# Patient Record
Sex: FEMALE
[De-identification: names, ages, dates, MRNs, and addresses within clinical notes are randomized per-mention and may not be internally consistent; named-entity substitution may affect disease eponyms.]

---

## 2020-04-30 ENCOUNTER — HOSPITAL ENCOUNTER (OUTPATIENT)
Dept: HOSPITAL 88 - DX | Age: 35
End: 2020-04-30
Attending: UROLOGY
Payer: COMMERCIAL

## 2020-04-30 DIAGNOSIS — N13.30: Primary | ICD-10-CM

## 2020-04-30 PROCEDURE — 81025 URINE PREGNANCY TEST: CPT

## 2020-04-30 PROCEDURE — 50387 CHANGE NEPHROURETERAL CATH: CPT

## 2020-04-30 NOTE — DIAGNOSTIC IMAGING REPORT
Procedure: Right percutaneous nephrostomy catheter exchange.                   

                                  

                                                        

History: Routine maintenance.                                                

   

: Nelson Hoffman M.D. 



Assistant:  Heydi., M.D.

                                                                            

Modality: Fluoroscopy.        

               

DOSE REDUCTION: The examination was performed according to departmental

dose-optimization program. 



Fluoro time: 0.7 minutes.



Radiation dose: 4.47 mGy air Kerma.  

                                                                             

Sedation: No sedation was used. Vital signs were monitored throughout the

procedure by a dedicated RN under direct supervision of Dr. Hoffman, and

remained stable. 



Physician intra-service sedation time was Not applicable. minutes.             



Anesthesia: Lidocaine local infiltration



Medicines: Not applicable



Contrast medium: Isovue 300, 5 cc.



Estimated blood loss:  < 5 cc.         



Technique: 

A discussion of the risks, benefits, and alternatives was carried out with the

patient. A written informed consent was obtained. The patient expressed

understanding and agreed to proceed.  



A universal timeout was performed prior to starting the procedure. The

procedure room personnel used personal protective equipment. The operators used

sterile gowns and gloves.



The patient was laid prone on the procedure table. The right percutaneous

nephrostomy site was prepped with chlorhexidine gluconate and draped in sterile

fashion.



A  radiograph was performed showing the catheter to be in the expected

location. Contrast injection through the catheter confirmed the catheter tip

location in the collecting system.



After local anesthetic infiltration, the retention suture was removed. The

catheter was cut and withdrawn over a wire. It was replaced with an identical

catheter. The catheter position was confirmed with contrast injection. The

catheter was secured to skin with nonabsorbable suture.  The catheter was

connected to a gravity drainage bag. An aseptic dressing was applied.



The patient was transferred to the recovery area and discharged from the

department in stable condition.

                    

Complications: None immediate.    



Findings:

As above.

                                                          

Impression:

Successful fluoroscopic guided 8.5 Italian right nephrostomy catheter exchange

as described above.



Further management dictated by the clinical scenario. The nephrostomy catheter

should be exchanged at the latest in three months.                             

                               

   



Thank you for the opportunity to assist in the care of your patient.



Signed by: Nelson Hoffman MD on 4/30/2020 2:40 PM

## 2020-05-22 ENCOUNTER — HOSPITAL ENCOUNTER (OUTPATIENT)
Dept: HOSPITAL 88 - OR | Age: 35
Discharge: HOME | End: 2020-05-22
Attending: UROLOGY
Payer: COMMERCIAL

## 2020-05-22 VITALS — DIASTOLIC BLOOD PRESSURE: 94 MMHG | SYSTOLIC BLOOD PRESSURE: 125 MMHG

## 2020-05-22 DIAGNOSIS — Z11.59: ICD-10-CM

## 2020-05-22 DIAGNOSIS — N13.5: ICD-10-CM

## 2020-05-22 DIAGNOSIS — E03.9: ICD-10-CM

## 2020-05-22 DIAGNOSIS — R35.1: ICD-10-CM

## 2020-05-22 DIAGNOSIS — I10: ICD-10-CM

## 2020-05-22 DIAGNOSIS — Z01.812: ICD-10-CM

## 2020-05-22 DIAGNOSIS — Z93.6: Primary | ICD-10-CM

## 2020-05-22 DIAGNOSIS — N39.0: ICD-10-CM

## 2020-05-22 DIAGNOSIS — Z87.442: ICD-10-CM

## 2020-05-22 DIAGNOSIS — N13.30: ICD-10-CM

## 2020-05-22 PROCEDURE — 87635 SARS-COV-2 COVID-19 AMP PRB: CPT

## 2020-05-22 PROCEDURE — 52351 CYSTOURETERO & OR PYELOSCOPE: CPT

## 2020-05-22 PROCEDURE — 81025 URINE PREGNANCY TEST: CPT

## 2020-05-22 PROCEDURE — 74420 UROGRAPHY RTRGR +-KUB: CPT

## 2020-05-22 NOTE — OPERATIVE REPORT
DATE OF PROCEDURE:  05/22/2020

 

SURGEON:  Donine Barr MD

 

PREOPERATIVE DIAGNOSES:  

1. Right-sided nephrostomy.

2. Right hydronephrosis.

3. Microscopic hematuria.

 

POSTOPERATIVE DIAGNOSES:  

1. Right-sided nephrostomy.

2. Right hydronephrosis.

3. Ureteral stricture.

4. Microscopic hematuria.

 

PROCEDURES:  

1. Cystourethroscopy with left ureteral catheterization and left retrograde 
pyelogram

(separate procedure for microscopic hematuria). 

2. Right-sided ureteroscopy (entirely separate procedure for right ureteral 
stricture).

3. Supervision of fluoroscopy for both ureteroscopic and retrograde portions.

4. Interpretation of retrograde pyelography.

 

ANESTHESIA:  General.

 

ESTIMATED BLOOD LOSS:  Minimal.

 

COMPLICATIONS:  None.

 

INDICATIONS:  Ms. Colon is a very pleasant 34-year-old female with a history 
of right

nephrostomy placed at [facility].  She has had a history of stones and severe 
strictures.  She and

I had a long discussion about alternatives, risks, and benefits of doing 
nothing, an

attempt to internalize stents.  She voiced understanding of the options, 
alternatives,

risks, and benefits and elected to proceed. 

 

PROCEDURE IN DETAIL:  After informed consent obtained, the patient was taken to 
the

operative suite, placed supine on the operative table, underwent general 
anesthesia by

the Anesthesia Service, placed in dorsal lithotomy position and sterilely 
prepped and

draped for cystoscopy.  A 21-Tuvaluan cystoscope was inserted per urethra.  Normal
urethra

was noted.  Panendoscopy of bladder revealed no tumors, no stones.  Both 
ureteral

orifices were in normal anatomic location and position only left was seen to 
efflux

clear urine.  Bilateral retrograde pyelogram performed with a 5-Tuvaluan open-
ended

catheter.  The left was normal.  The right was blind-ending approximately 2 
inches from

the ureteral orifice. the short rigid ureteroscope was advanced to a

blind-ending pouch in the distal ureter, photograph of which was taken.  An 
inability to

connect the proximal ureter the distal, we terminated the procedure, drained the

bladder.  The patient was awakened from anesthesia and transported to the 
recovery room

in excellent condition. 

 

Supervision of fluoroscopy and interpretation of retrograde pyelography:  I was 
present

for the entire procedure and supervised fluoroscopy.  There was no radiologist 
present.

Attention was turned to the left and right ureters catheters.  A 5-Tuvaluan rubber

catheter retrograde pyelogram 

was performed on the left side revealing a delicate ureter, delicate 
pelvocaliceal

systems on the right side a blind-ending distal ureter approximately 2 inches 
from the

bladder. 

 

 

 

 

______________________________

MD MANDO Burkett/JUAN MIGUEL

D:  05/22/2020 09:38:33

T:  05/22/2020 13:08:20

Job #:  [id number]

 

MTDD

## 2020-07-27 ENCOUNTER — HOSPITAL ENCOUNTER (OUTPATIENT)
Dept: HOSPITAL 88 - DX | Age: 35
End: 2020-07-27
Attending: UROLOGY
Payer: COMMERCIAL

## 2020-07-27 DIAGNOSIS — N13.30: Primary | ICD-10-CM

## 2020-07-27 PROCEDURE — 50387 CHANGE NEPHROURETERAL CATH: CPT

## 2020-07-27 NOTE — DIAGNOSTIC IMAGING REPORT
PROCEDURE: Genitourinary catheter exchange



Procedural Personnel

Attending physician(s): Desi Gr MD

Fellow physician(s): None

Resident physician(s): None

Advanced practice provider(s): None



Pre-procedure diagnosis: Urinary obstruction

Post-procedure diagnosis: Same

Indication: Catheter malfunction (cracked at hub)

Additional clinical history: None



Complications: No immediate complications.



IMPRESSION:



Successful right PCN exchange for new 8Fr Uresil drainage catheter. Locking

loop in renal pelvis.



Plan: 

Routine exchange in 8 weeks or as needed.

_______________________________________________________________



PROCEDURE SUMMARY

- Target organ: Right native kidney

- Antegrade nephrostogram(s) via the existing access

- Nephrostomy tube exchange

- Additional procedure(s): None



PROCEDURE DETAILS:



Pre-procedure

Consent: Informed consent for the procedure including risks, benefits and

alternatives was obtained and time-out was performed prior to the procedure.

Preparation: The site was prepared and draped using maximal sterile barrier

technique including cutaneous antisepsis.



Anesthesia/sedation

Level of anesthesia/sedation: No sedation

Anesthesia/sedation administered by: Independent trained observer under

attending supervision with continuous monitoring of the patient?s level of

consciousness and physiologic status

Total intra-service sedation time (minutes): NA



Right genitourinary catheter exchange

Local anesthesia was administered. Initial nephrostogram was performed. A wire

was placed through the existing tube and it was removed. The new tube was

advanced over the wire and position was confirmed with contrast injection.

Pre-existing genitourinary catheter: Uresil 8Fr

Genitourinary catheter(s) placed: Uresil 8Fr 

Findings: Catheter had retracted slightly, locking loop in a lower pole calyx.

New catheter placed with locking loop in renal pelvis.

External catheter securement: Non-absorbable suture 



Additional genitourinary system intervention

Genitourinary intervention: None

Location of intervention: Not applicable

Device used: Not applicable

Description of intervention: Not applicable

Post-intervention findings: Not applicable



Contrast

Contrast agent: Isovue 370

Contrast volume (mL): 10cc



Radiation Dose

Fluoroscopy time (minutes): 0.8  

Reference air kerma (mGy): 6.3 



Additional Details

Additional description of procedure: None

Equipment details: None

Specimens removed: None

Estimated blood loss (mL): Less than 10

Standardized report: [id number]



Attestation

Signer name: Desi Gr MD

I attest that I was present for the entire procedure. I reviewed the stored

images and agree with the report as written.









Signed by: Desi Gr MD on 7/27/2020 9:31 AM